# Patient Record
Sex: MALE | Race: WHITE
[De-identification: names, ages, dates, MRNs, and addresses within clinical notes are randomized per-mention and may not be internally consistent; named-entity substitution may affect disease eponyms.]

---

## 2019-01-06 ENCOUNTER — HOSPITAL ENCOUNTER (EMERGENCY)
Dept: HOSPITAL 84 - D.ER | Age: 29
Discharge: HOME | End: 2019-01-06
Payer: SELF-PAY

## 2019-01-06 VITALS — DIASTOLIC BLOOD PRESSURE: 79 MMHG | SYSTOLIC BLOOD PRESSURE: 132 MMHG

## 2019-01-06 VITALS — BODY MASS INDEX: 19.53 KG/M2 | HEIGHT: 63 IN | WEIGHT: 110.23 LBS

## 2019-01-06 DIAGNOSIS — K29.70: Primary | ICD-10-CM

## 2019-01-06 LAB
ALBUMIN SERPL-MCNC: 4.1 G/DL (ref 3.4–5)
ALP SERPL-CCNC: 125 U/L (ref 46–116)
ALT SERPL-CCNC: 22 U/L (ref 10–68)
AMYLASE SERPL-CCNC: 73 U/L (ref 25–115)
ANION GAP SERPL CALC-SCNC: 12.3 MMOL/L (ref 8–16)
APPEARANCE UR: CLEAR
BACTERIA #/AREA URNS HPF: (no result) /HPF
BASOPHILS NFR BLD AUTO: 0.6 % (ref 0–2)
BILIRUB SERPL-MCNC: 0.21 MG/DL (ref 0.2–1.3)
BILIRUB SERPL-MCNC: NEGATIVE MG/DL
BUN SERPL-MCNC: 24 MG/DL (ref 7–18)
CALCIUM SERPL-MCNC: 8.8 MG/DL (ref 8.5–10.1)
CHLORIDE SERPL-SCNC: 102 MMOL/L (ref 98–107)
CO2 SERPL-SCNC: 25.9 MMOL/L (ref 21–32)
COLOR UR: YELLOW
CREAT SERPL-MCNC: 1 MG/DL (ref 0.6–1.3)
EOSINOPHIL NFR BLD: 3.9 % (ref 0–7)
ERYTHROCYTE [DISTWIDTH] IN BLOOD BY AUTOMATED COUNT: 13.2 % (ref 11.5–14.5)
GLOBULIN SER-MCNC: 3.8 G/L
GLUCOSE SERPL-MCNC: 95 MG/DL (ref 74–106)
GLUCOSE SERPL-MCNC: NEGATIVE MG/DL
HCT VFR BLD CALC: 46.2 % (ref 42–54)
HGB BLD-MCNC: 15.4 G/DL (ref 13.5–17.5)
IMM GRANULOCYTES NFR BLD: 0.1 % (ref 0–5)
KETONES UR STRIP-MCNC: NEGATIVE MG/DL
LIPASE SERPL-CCNC: 68 U/L (ref 73–393)
LYMPHOCYTES NFR BLD AUTO: 42.4 % (ref 15–50)
MCH RBC QN AUTO: 32.4 PG (ref 26–34)
MCHC RBC AUTO-ENTMCNC: 33.3 G/DL (ref 31–37)
MCV RBC: 97.1 FL (ref 80–100)
MONOCYTES NFR BLD: 6.8 % (ref 2–11)
NEUTROPHILS NFR BLD AUTO: 46.2 % (ref 40–80)
NITRITE UR-MCNC: NEGATIVE MG/ML
OSMOLALITY SERPL CALC.SUM OF ELEC: 275 MOSM/KG (ref 275–300)
PH UR STRIP: 5 [PH] (ref 5–6)
PLATELET # BLD: 242 10X3/UL (ref 130–400)
PMV BLD AUTO: 10.7 FL (ref 7.4–10.4)
POTASSIUM SERPL-SCNC: 4.2 MMOL/L (ref 3.5–5.1)
PROT SERPL-MCNC: 7.9 G/DL (ref 6.4–8.2)
PROT UR-MCNC: NEGATIVE MG/DL
RBC # BLD AUTO: 4.76 10X6/UL (ref 4.2–6.1)
RBC #/AREA URNS HPF: (no result) /HPF (ref 0–5)
SODIUM SERPL-SCNC: 136 MMOL/L (ref 136–145)
SP GR UR STRIP: 1.01 (ref 1–1.02)
SQUAMOUS #/AREA URNS HPF: (no result) /HPF (ref 0–5)
UROBILINOGEN UR-MCNC: NORMAL MG/DL
WBC # BLD AUTO: 8.2 10X3/UL (ref 4.8–10.8)
WBC #/AREA URNS HPF: (no result) /HPF (ref 0–5)

## 2019-01-23 ENCOUNTER — HOSPITAL ENCOUNTER (INPATIENT)
Dept: HOSPITAL 84 - D.ER | Age: 29
LOS: 5 days | Discharge: HOME | DRG: 682 | End: 2019-01-28
Attending: FAMILY MEDICINE | Admitting: FAMILY MEDICINE
Payer: MEDICAID

## 2019-01-23 VITALS
WEIGHT: 99.21 LBS | HEIGHT: 63 IN | BODY MASS INDEX: 17.58 KG/M2 | WEIGHT: 99.21 LBS | BODY MASS INDEX: 17.58 KG/M2 | HEIGHT: 63 IN

## 2019-01-23 DIAGNOSIS — N17.9: Primary | ICD-10-CM

## 2019-01-23 DIAGNOSIS — M54.9: ICD-10-CM

## 2019-01-23 DIAGNOSIS — F41.9: ICD-10-CM

## 2019-01-23 DIAGNOSIS — Z59.0: ICD-10-CM

## 2019-01-23 DIAGNOSIS — F17.210: ICD-10-CM

## 2019-01-23 DIAGNOSIS — G89.29: ICD-10-CM

## 2019-01-23 DIAGNOSIS — I10: ICD-10-CM

## 2019-01-23 DIAGNOSIS — E43: ICD-10-CM

## 2019-01-23 DIAGNOSIS — M62.82: ICD-10-CM

## 2019-01-23 DIAGNOSIS — J44.9: ICD-10-CM

## 2019-01-23 DIAGNOSIS — G25.81: ICD-10-CM

## 2019-01-23 LAB
ALBUMIN SERPL-MCNC: 4.5 G/DL (ref 3.4–5)
ALP SERPL-CCNC: 117 U/L (ref 46–116)
ALT SERPL-CCNC: 35 U/L (ref 10–68)
ANION GAP SERPL CALC-SCNC: 29.4 MMOL/L (ref 8–16)
BASOPHILS NFR BLD AUTO: 0.1 % (ref 0–2)
BILIRUB SERPL-MCNC: 0.67 MG/DL (ref 0.2–1.3)
BUN SERPL-MCNC: 49 MG/DL (ref 7–18)
CALCIUM SERPL-MCNC: 8.4 MG/DL (ref 8.5–10.1)
CHLORIDE SERPL-SCNC: 100 MMOL/L (ref 98–107)
CO2 SERPL-SCNC: 16.3 MMOL/L (ref 21–32)
CREAT SERPL-MCNC: 1.7 MG/DL (ref 0.6–1.3)
EOSINOPHIL NFR BLD: 0 % (ref 0–7)
ERYTHROCYTE [DISTWIDTH] IN BLOOD BY AUTOMATED COUNT: 13.5 % (ref 11.5–14.5)
GLOBULIN SER-MCNC: 3.6 G/L
GLUCOSE SERPL-MCNC: 65 MG/DL (ref 74–106)
HCT VFR BLD CALC: 43.2 % (ref 42–54)
HGB BLD-MCNC: 14.7 G/DL (ref 13.5–17.5)
IMM GRANULOCYTES NFR BLD: 0.3 % (ref 0–5)
LYMPHOCYTES NFR BLD AUTO: 4.6 % (ref 15–50)
MCH RBC QN AUTO: 32.3 PG (ref 26–34)
MCHC RBC AUTO-ENTMCNC: 34 G/DL (ref 31–37)
MCV RBC: 94.9 FL (ref 80–100)
MONOCYTES NFR BLD: 4.1 % (ref 2–11)
NEUTROPHILS NFR BLD AUTO: 90.9 % (ref 40–80)
OSMOLALITY SERPL CALC.SUM OF ELEC: 291 MOSM/KG (ref 275–300)
PLATELET # BLD: 239 10X3/UL (ref 130–400)
PMV BLD AUTO: 11.1 FL (ref 7.4–10.4)
POTASSIUM SERPL-SCNC: 4.7 MMOL/L (ref 3.5–5.1)
PROT SERPL-MCNC: 8.1 G/DL (ref 6.4–8.2)
RBC # BLD AUTO: 4.55 10X6/UL (ref 4.2–6.1)
SODIUM SERPL-SCNC: 141 MMOL/L (ref 136–145)
WBC # BLD AUTO: 17.9 10X3/UL (ref 4.8–10.8)

## 2019-01-23 SDOH — ECONOMIC STABILITY - HOUSING INSECURITY: HOMELESSNESS: Z59.0

## 2019-01-24 VITALS — SYSTOLIC BLOOD PRESSURE: 110 MMHG | DIASTOLIC BLOOD PRESSURE: 68 MMHG

## 2019-01-24 VITALS — SYSTOLIC BLOOD PRESSURE: 108 MMHG | DIASTOLIC BLOOD PRESSURE: 72 MMHG

## 2019-01-24 VITALS — SYSTOLIC BLOOD PRESSURE: 101 MMHG | DIASTOLIC BLOOD PRESSURE: 62 MMHG

## 2019-01-24 VITALS — SYSTOLIC BLOOD PRESSURE: 110 MMHG | DIASTOLIC BLOOD PRESSURE: 75 MMHG

## 2019-01-24 VITALS — SYSTOLIC BLOOD PRESSURE: 111 MMHG | DIASTOLIC BLOOD PRESSURE: 55 MMHG

## 2019-01-24 VITALS — DIASTOLIC BLOOD PRESSURE: 63 MMHG | SYSTOLIC BLOOD PRESSURE: 109 MMHG

## 2019-01-24 LAB
BASOPHILS NFR BLD AUTO: 0.1 % (ref 0–2)
CK MB SERPL-MCNC: 20.2 U/L (ref 0–3.6)
CK MB SERPL-MCNC: 28.6 U/L (ref 0–3.6)
CK SERPL-CCNC: 2179 UL (ref 21–232)
CK SERPL-CCNC: 6113 UL (ref 21–232)
EOSINOPHIL NFR BLD: 0.7 % (ref 0–7)
ERYTHROCYTE [DISTWIDTH] IN BLOOD BY AUTOMATED COUNT: 13.8 % (ref 11.5–14.5)
HCT VFR BLD CALC: 39.3 % (ref 42–54)
HGB BLD-MCNC: 13.2 G/DL (ref 13.5–17.5)
IMM GRANULOCYTES NFR BLD: 0.2 % (ref 0–5)
LYMPHOCYTES NFR BLD AUTO: 23.7 % (ref 15–50)
MCH RBC QN AUTO: 32.2 PG (ref 26–34)
MCHC RBC AUTO-ENTMCNC: 33.6 G/DL (ref 31–37)
MCV RBC: 95.9 FL (ref 80–100)
MONOCYTES NFR BLD: 9.2 % (ref 2–11)
NEUTROPHILS NFR BLD AUTO: 66.1 % (ref 40–80)
PLATELET # BLD: 224 10X3/UL (ref 130–400)
PMV BLD AUTO: 11.3 FL (ref 7.4–10.4)
RBC # BLD AUTO: 4.1 10X6/UL (ref 4.2–6.1)
WBC # BLD AUTO: 12.1 10X3/UL (ref 4.8–10.8)

## 2019-01-24 NOTE — NUR
RESUMING CARE. PT LAYING IN BED A&O RR EVEN/UNLABORED, LFT AC WITH NS @125, ON
RA NO C/O PAIN OR DISTRESS NOTED CL IN REACH WILL CONT TO MONITOR

## 2019-01-24 NOTE — MORECARE
CASE MANAGEMENT DISCHARGE SUMMARY
 
 
PATIENT: ENOCH PEDERSON DON                UNIT: [id number]
ACCOUNT#: [id number]                       ADM DATE: 19
AGE: 28     : [birth year]  SEX: M            ROOM/BED: D.2131    
AUTHOR: DEXTER ASHLEY                             PHYSICIAN:                               
 
REFERRING PHYSICIAN: ATA CARVALHO DO            
DATE OF SERVICE: 19
Discharge Plan
 
 
Patient Name: ENOCH PEDERSON
[facility]
Encounter #: [id number]
Medical Record #: [id number]
: 1990
Planned Disposition: Home
Anticipated Discharge Date: 19
 
Discharge Date: 
Expected LOS: 1
Initial Reviewer: [id number]
Initial Review Date: 2019
Generated: 19   6:57 pm 
 DCPIA - Discharge Planning Initial Assessment
 
Updated by [id number]: Bennett Preciado on 19   5:57 pm
*  Is the patient Alert and Oriented?
Yes
*  How many steps to enter\exit or inside your home? NONE *  PCP NONE *  Pharmacy NONE * 
Preadmission Environment
Homeless
*  Other Environment
HOMELESS, REPORTS LIVING IN "[facility]"
*  Facility Name
NONE
*  ADLs
Independent
*  Equipment
None
*  Other Equipment
NO MEDICAL EQUIPMENT PROVIDER PREFERENCE
*  List name and contact numbers for known caregivers / representatives who 
currently or will assist patient after discharge:
NONE PER PATIENT
*  Verbal permission to speak to the caregivers and representatives has been 
obtained from the patient.
N/A
 
*  Community resources currently utilized
None
*  Please name any agencies selected above.
NONE
*  Additional services required to return to the preadmission environment?
No
*  Can the patient safely return to the preadmission environment?
Yes
*  Has this patient been hospitalized within the prior 30 days at any 
hospital?
No
 
 
 
 
 
 
Patient Name: ENOCH PEDERSON
Encounter #: [id number]
Page [id number]
 
 
 
 
 
Electronically Signed by DEXTER ASHLEY on 19 at 1757
 
 
 
 
 
 
**All edits/amendments must be made on the electronic document**
 
DICTATION DATE: 19     : FRANTZ  19     
RPT#: [id number]                                DC DATE:        
                                               STATUS: ADM IN  
[facility]
 [place] [address]
***END OF REPORT***

## 2019-01-24 NOTE — MORECARE
CASE MANAGEMENT DISCHARGE SUMMARY
 
 
PATIENT: ENOCH PEDERSON DON                UNIT: [id number]
ACCOUNT#: [id number]                       ADM DATE: 19
AGE: 28     : [birth year]  SEX: M            ROOM/BED: D.2135    
AUTHOR: GABI,DOC                             PHYSICIAN:                               
 
REFERRING PHYSICIAN: ATA CARVALHO DO            
DATE OF SERVICE: 19
Discharge Plan
 
 
Patient Name: ENOCH PEDERSON
Facility: [facility]:[place]
Encounter #: [id number]
Medical Record #: [id number]
: 1990
Planned Disposition: Home
Anticipated Discharge Date: 19
 
Discharge Date: 
Expected LOS: 1
Initial Reviewer: [email or web address]
Initial Review Date: 2019
Generated: 19   7:10 pm 
Comments
 
DCP- Discharge Planning
 
Updated by [id number]: Bennett Preciado on 19   5:04 pm CT
Patient Name: ENOCH PEDERSON                                     
Admission Status: ER   
Accout number: [id number]                              
Admission Date: 2019   
: 1990                                                        
Admission Diagnosis:   
Attending: ATA CARVALHO                                                
Current LOS:  1   
  
Anticipated DC Date: 2019   
Planned Disposition: Home   
Primary Insurance: MEDICAID [place]   
  
  
Discharge Planning Comments:   
CM RECEIVED CONSULT FOR HOMLESSNESS.  CM MET WITH PT IN ROOM TO DISCUSS 
DISCHARGE PLANNING AND NEEDS.  PT RE[place], HAS 
BEEN HOMELESS FOR " A WHILE."  PT USES NO MEDICAL EQUIPMENT.  PT REPORTS 
HAVING NO [place] ID AND HE NEEDS TO GET ONE AS HE HAS A [place] ID NOW.  
 
PT DENIES HAVING FUNDS TO ASSIST HIMSELF, PT REPORTS HE HAS NO FAMILY OR 
FRIENDS TO ASSIST AT DISCHARGE.  CM DISCUSSED AVAILABILITY OF SHELTER AT 
[facility] IN [place].  PT STATES AWARENESS OF THE SHELTER, 
REPORTS "THAT'S LIKE A halfway".  PT WILL NOT RETURN TO THE SHELTER.  CM OFFERED 
TO LOOK FOR SHELTER OUTSIDE OF [place], PT REFUSED.  PT IS NOT SURE HOW 
LONG HE WILL BE STAYING IN [place].  CM DISCUSSED EMERGENCY ASSISTANCE, 
FOOD PANTRY AND HOT MEAL DAILY AT [facility], PT REPORTS HE IS AWARE OF 
RESOURCE.  CM PROVIDED PT WITH [facility] ADDRESS AS WELL AS CONTACT FOR 
[facility] OUT OF POVERTY PROGRAM.  PT DID 
NOT VERBALIZE INTEREST, CM LET INFORMATION ANYWAY.  PT HAS NO PRIMARY DOCTOR, 
BUT DOES HAVE MEDICAID IN [place].  CM PROVIDED [facility] 
INFORMATION.  CM ASKED PT HOW HE IS TRANSPORTING WHEN HE LEAVES, PT REPORTS 
WALKING BUT HE HAS NO SHOES AND WEARS SIZE 9.    
  
CM WILL PROVIDE PT WITH A BUS PASS AT DISCHARGE.  PT HAS DECLINED SHELTER 
PLACEMENT ASSISTANCE, REFUSES TO STAY AT THE [facility] SAYING IT 
IS LIKE A halfway.  CM PROVIDED PT WITH LOCAL RESOURCE INFORMATION.  CM TO 
FOLLOW AND ASSIST AS NEEDED.  
  
: Bennett Preciado
 DCPIA - Discharge Planning Initial Assessment
 
Updated by [id number]: Bennett Preciado on 19   5:57 pm
*  Is the patient Alert and Oriented?
Yes
*  How many steps to enter\exit or inside your home? NONE *  PCP NONE *  Pharmacy NONE * 
Preadmission Environment
Homeless
*  Other Environment
HOMELESS, REPORTS LIVING IN "[facility]"
*  Facility Name
NONE
*  ADLs
Independent
*  Equipment
None
*  Other Equipment
NO MEDICAL EQUIPMENT PROVIDER PREFERENCE
*  List name and contact numbers for known caregivers / representatives who 
currently or will assist patient after discharge:
NONE PER PATIENT
*  Verbal permission to speak to the caregivers and representatives has been 
obtained from the patient.
N/A
*  Community resources currently utilized
None
*  Please name any agencies selected above.
NONE
*  Additional services required to return to the preadmission environment?
No
*  Can the patient safely return to the preadmission environment?
Yes
 
*  Has this patient been hospitalized within the prior 30 days at any 
hospital?
No
 
 
 
 
 
 
 
Last DP export: 19   4:57 p
Patient Name: ENOCH PEDERSON
Encounter #: [id number]
Page [phone]
 
 
 
 
 
Electronically Signed by DEXTER ASHLEY on 19 at 1810
 
 
 
 
 
 
**All edits/amendments must be made on the electronic document**
 
DICTATION DATE: 19     : FRANTZ  19     
RPT#: [id number]                                CT DATE:        
                                               STATUS: ADM IN  
[facility]
 [place] [address]
***END OF REPORT***

## 2019-01-25 VITALS — DIASTOLIC BLOOD PRESSURE: 66 MMHG | SYSTOLIC BLOOD PRESSURE: 109 MMHG

## 2019-01-25 VITALS — SYSTOLIC BLOOD PRESSURE: 101 MMHG | DIASTOLIC BLOOD PRESSURE: 53 MMHG

## 2019-01-25 VITALS — DIASTOLIC BLOOD PRESSURE: 69 MMHG | SYSTOLIC BLOOD PRESSURE: 110 MMHG

## 2019-01-25 VITALS — SYSTOLIC BLOOD PRESSURE: 107 MMHG | DIASTOLIC BLOOD PRESSURE: 68 MMHG

## 2019-01-25 VITALS — SYSTOLIC BLOOD PRESSURE: 117 MMHG | DIASTOLIC BLOOD PRESSURE: 63 MMHG

## 2019-01-25 LAB
ALBUMIN SERPL-MCNC: 2.9 G/DL (ref 3.4–5)
ALP SERPL-CCNC: 78 U/L (ref 46–116)
ALT SERPL-CCNC: 59 U/L (ref 10–68)
ANION GAP SERPL CALC-SCNC: 13.5 MMOL/L (ref 8–16)
BASOPHILS NFR BLD AUTO: 0.4 % (ref 0–2)
BILIRUB SERPL-MCNC: 0.51 MG/DL (ref 0.2–1.3)
BUN SERPL-MCNC: 16 MG/DL (ref 7–18)
CALCIUM SERPL-MCNC: 7.3 MG/DL (ref 8.5–10.1)
CHLORIDE SERPL-SCNC: 110 MMOL/L (ref 98–107)
CK MB SERPL-MCNC: 10.4 U/L (ref 0–3.6)
CK SERPL-CCNC: 3678 UL (ref 21–232)
CO2 SERPL-SCNC: 23.9 MMOL/L (ref 21–32)
CREAT SERPL-MCNC: 1.1 MG/DL (ref 0.6–1.3)
EOSINOPHIL NFR BLD: 2.3 % (ref 0–7)
ERYTHROCYTE [DISTWIDTH] IN BLOOD BY AUTOMATED COUNT: 13.8 % (ref 11.5–14.5)
GLOBULIN SER-MCNC: 2.5 G/L
GLUCOSE SERPL-MCNC: 78 MG/DL (ref 74–106)
HCT VFR BLD CALC: 34.3 % (ref 42–54)
HGB BLD-MCNC: 11.3 G/DL (ref 13.5–17.5)
IMM GRANULOCYTES NFR BLD: 0.1 % (ref 0–5)
LYMPHOCYTES NFR BLD AUTO: 47.5 % (ref 15–50)
MAGNESIUM SERPL-MCNC: 1.7 MG/DL (ref 1.8–2.4)
MCH RBC QN AUTO: 31.5 PG (ref 26–34)
MCHC RBC AUTO-ENTMCNC: 32.9 G/DL (ref 31–37)
MCV RBC: 95.5 FL (ref 80–100)
MONOCYTES NFR BLD: 12 % (ref 2–11)
NEUTROPHILS NFR BLD AUTO: 37.7 % (ref 40–80)
OSMOLALITY SERPL CALC.SUM OF ELEC: 284 MOSM/KG (ref 275–300)
PLATELET # BLD: 196 10X3/UL (ref 130–400)
PMV BLD AUTO: 11 FL (ref 7.4–10.4)
POTASSIUM SERPL-SCNC: 4.4 MMOL/L (ref 3.5–5.1)
PROT SERPL-MCNC: 5.4 G/DL (ref 6.4–8.2)
RBC # BLD AUTO: 3.59 10X6/UL (ref 4.2–6.1)
SODIUM SERPL-SCNC: 143 MMOL/L (ref 136–145)
WBC # BLD AUTO: 7.4 10X3/UL (ref 4.8–10.8)

## 2019-01-25 NOTE — MORECARE
CASE MANAGEMENT DISCHARGE SUMMARY
 
 
PATIENT: ENOCH PEDERSON DON                UNIT: [id number]
ACCOUNT#: [id number]                       ADM DATE: 19
AGE: 28     : [birth year]  SEX: M            ROOM/BED: D.2135    
AUTHOR: DEXTER ASHLEY                             PHYSICIAN:                               
 
REFERRING PHYSICIAN: ATA CARVALHO DO            
DATE OF SERVICE: 19
Discharge Plan
 
 
Patient Name: ENOCH PEDERSON
Facility: [facility]:[place]
Encounter #: [id number]
Medical Record #: [id number]
: 1990
Planned Disposition: Home
Anticipated Discharge Date: 19
 
Discharge Date: 
Expected LOS: 1
Initial Reviewer: [id number]
Initial Review Date: 2019
Generated: 19   9:41 am 
Comments
 
DCP- Discharge Planning
 
Updated by [id number]: Bennett Preciado on 19   7:36 am CT
Patient Name:  ENOCH PEDERSON   
Encounter No:  [id number]   
:  1990   
Primary Insurance:  MEDICAID [place]  
Anticipated DC Date: 2019   
Planned Disposition:  Home  
  
  
DCP follow-up note: CM SPOKE TO PT IN ROOM REGARDING DISCHARGE PLANNING.  PT 
IS STILL REFUSING SHELTER PLACEMENT OUT OF [place] AREA AND WILL NOT GO 
TO [facility].  PT IS AWARE OF LOCAL SHELTER AND 
RESOURCES AVAILABLE TO HIM.  PT REPORTS PLAN OF CONTINUED HOMELESSNESS AT 
DISCHARGE.  PT DOES NOT HAVE AN INCOME, DOES NOT RECEIVE FOOD STAMPS, 
DISABILITY OR UNEMPLOYMENT. PT IS NOT EMPLOYED.   PT REPORTS HE LIVES WITH 
WHAT HE CAN GET FROM LOCAL ORGANIZATIONS.  PT HAS BEEN PROVIDED WITH LOCAL 
NEWSPAPER FOR TODAY.  CM PROVIDED PT WITH A PAIR OF SHOES AS PT REPORTED HE 
HAS NONE.  CM PROVIDED PT WITH AN OLD BUT SERVICEABLE UMBRELLA AND THREE 
LARGE TRASH BAGS.  CM EXPLAINED HOW TO USE PLASTIC BAGS FOR A RAIN PONCHO, 
SHELTER ADRYAN AND SHELTER FABRICIO IF NEEDED.  CM PROVIDED ONE BUS PASS SO 
 
PT CAN GET BACK TO [facility] [place] AND COMMUNITY RESOURCE 
ORGANIZATIONS THAT PT IS UTILIZING AT DISCHARGE.  PT DOES HAVE [place] 
MEDICAID.  
  
PT REFUSES SHELTER PLACEMENT.  PT IS AWARE OF COMMUNITY RESOURCES.  CM HAS 
PROVIDED PT WITH INFORMATION AND MATERIALS TO ASSIST HIMSELF.  NO FURHTER 
DISCHARGE NEEDS IDENTIFIED AT THIS TIME.    
  
Bennett Preciado, CASE MANAGEMENT
DCP- Discharge Planning
 
Updated by [id number]: Bennett Preciado on 19   5:04 pm CT
Patient Name: ENOCH PEDERSON                                     
Admission Status: ER   
Accout number: [id number]                              
Admission Date: 2019   
: 1990                                                        
Admission Diagnosis:   
Attending: ATA CARVALHO                                                
Current LOS:  1   
  
Anticipated DC Date: 2019   
Planned Disposition: Home   
Primary Insurance: MEDICAID [place]   
  
  
Discharge Planning Comments:   
CM RECEIVED CONSULT FOR HOMLESSNESS.  CM MET WITH PT IN ROOM TO DISCUSS 
DISCHARGE PLANNING AND NEEDS.  PT REPORTS LIVING IN THE [place] FOR MONTHS, HAS 
BEEN HOMELESS FOR " A WHILE."  PT USES NO MEDICAL EQUIPMENT.  PT REPORTS 
HAVING NO [place] ID AND HE NEEDS TO GET ONE AS HE HAS A [place] ID NOW.  
PT DENIES HAVING FUNDS TO ASSIST HIMSELF, PT REPORTS HE HAS NO FAMILY OR 
FRIENDS TO ASSIST AT DISCHARGE.  CM DISCUSSED AVAILABILITY OF SHELTER AT 
[facility] IN [place].  PT STATES AWARENESS OF THE SHELTER, 
REPORTS "THAT'S LIKE A alf".  PT WILL NOT RETURN TO THE SHELTER.  CM OFFERED 
TO LOOK FOR SHELTER OUTSIDE OF [place], PT REFUSED.  PT IS NOT SURE HOW 
LONG HE WILL BE STAYING IN [place].  CM DISCUSSED EMERGENCY ASSISTANCE, 
FOOD PANTRY AND HOT MEAL DAILY AT [facility], PT REPORTS HE IS AWARE OF 
RESOURCE.  CM PROVIDED PT WITH [facility] ADDRESS AS WELL AS CONTACT FOR 
[facility] [place] OUT OF POVERTY PROGRAM.  PT DID 
NOT VERBALIZE INTEREST, CM LET INFORMATION ANYWAY.  PT HAS NO PRIMARY DOCTOR, 
BUT DOES HAVE MEDICAID IN [place].  CM PROVIDED [facility] 
INFORMATION.  CM ASKED PT HOW HE IS TRANSPORTING WHEN HE LEAVES, PT REPORTS 
WALKING BUT HE HAS NO SHOES AND WEARS SIZE 9.    
  
CM WILL PROVIDE PT WITH A BUS PASS AT DISCHARGE.  PT HAS DECLINED SHELTER 
PLACEMENT ASSISTANCE, REFUSES TO STAY AT THE [facility] SAYING IT 
IS LIKE A alf.  CM PROVIDED PT WITH LOCAL RESOURCE INFORMATION.  CM TO 
FOLLOW AND ASSIST AS NEEDED.  
  
: Bennett Preciado
 DCPIA - Discharge Planning Initial Assessment
 
 
Updated by [id number]: Bennett Preciado on 19   5:57 pm
*  Is the patient Alert and Oriented?
Yes
*  How many steps to enter\exit or inside your home? NONE *  PCP NONE *  Pharmacy NONE * 
Preadmission Environment
Homeless
*  Other Environment
HOMELESS, REPORTS [place]IVING [place]"
*  Facility Name
NONE
*  ADLs
Independent
*  Equipment
None
*  Other Equipment
NO MEDICAL EQUIPMENT PROVIDER PREFERENCE
*  List name and contact numbers for known caregivers / representatives who 
currently or will assist patient after discharge:
NONE PER PATIENT
*  Verbal permission to speak to the caregivers and representatives has been 
obtained from the patient.
N/A
*  Community resources currently utilized
None
*  Please name any agencies selected above.
NONE
*  Additional services required to return to the preadmission environment?
No
*  Can the patient safely return to the preadmission environment?
Yes
*  Has this patient been hospitalized within the prior 30 days at any 
hospital?
No
 
 
 
 
 
 
 
Last DP export: 19   5:10 p
Patient Name: ENOCH PEDERSON
 
Encounter #: [id number]
Page [phone]
 
 
 
 
 
Electronically Signed by DEXTER ASHLEY on 19 at 0841
 
 
 
 
 
 
**All edits/amendments must be made on the electronic document**
 
DICTATION DATE: 19     : FRANTZ  19     
RPT#: [id number]                                DC DATE:        
                                               STATUS: ADM IN  
[facility]
[address] [place] [address]
***END OF REPORT***

## 2019-01-25 NOTE — NUR
REPORT RECEIVED FROM NIGHT SHIFT. PATIENT LAYING IN BED WITH EYES CLOSED AND
BREATHING EVENLY. SR UP X 2 BED IN LOW POSITON AND CALL LIGHT IN REACH. WILL
CONTINUE WITH PLAN OF CARE.

## 2019-01-25 NOTE — NUR
INTRODUCED SELF TO PATIENT, PATIENT RESTING ON RIGHT SIDE STATES THAT HE'S IN
PAIN AND HIS HAND KEEPS SHAKING. ADVISED PATIENT WILL CHECK FOR LAST PAIN MED.
RESP EVEN AND UNLABORED. BED IN LOWEST POSITION, CALL LIGHT WITHIN REACH.

## 2019-01-25 NOTE — NUR
REPORT RECIEVED FROM NIGHT SHIFT. PATIENT LAYING IN BED WITH EYES CLOSED AND
BREATHING EVENLY. SR UP X 2 . BED IN LOW PSOTION AND CALL LIGHT IN REACH.

## 2019-01-26 VITALS — SYSTOLIC BLOOD PRESSURE: 118 MMHG | DIASTOLIC BLOOD PRESSURE: 64 MMHG

## 2019-01-26 VITALS — DIASTOLIC BLOOD PRESSURE: 57 MMHG | SYSTOLIC BLOOD PRESSURE: 104 MMHG

## 2019-01-26 VITALS — DIASTOLIC BLOOD PRESSURE: 62 MMHG | SYSTOLIC BLOOD PRESSURE: 106 MMHG

## 2019-01-26 VITALS — SYSTOLIC BLOOD PRESSURE: 119 MMHG | DIASTOLIC BLOOD PRESSURE: 67 MMHG

## 2019-01-26 VITALS — DIASTOLIC BLOOD PRESSURE: 69 MMHG | SYSTOLIC BLOOD PRESSURE: 120 MMHG

## 2019-01-26 LAB
ALBUMIN SERPL-MCNC: 2.8 G/DL (ref 3.4–5)
ALP SERPL-CCNC: 80 U/L (ref 46–116)
ALT SERPL-CCNC: 58 U/L (ref 10–68)
ANION GAP SERPL CALC-SCNC: 14.5 MMOL/L (ref 8–16)
BASOPHILS NFR BLD AUTO: 0.4 % (ref 0–2)
BILIRUB SERPL-MCNC: 0.39 MG/DL (ref 0.2–1.3)
BUN SERPL-MCNC: 16 MG/DL (ref 7–18)
CALCIUM SERPL-MCNC: 7.7 MG/DL (ref 8.5–10.1)
CHLORIDE SERPL-SCNC: 107 MMOL/L (ref 98–107)
CK MB SERPL-MCNC: 3.3 U/L (ref 0–3.6)
CK MB SERPL-MCNC: 4 U/L (ref 0–3.6)
CK SERPL-CCNC: 1798 UL (ref 21–232)
CK SERPL-CCNC: 1861 UL (ref 21–232)
CO2 SERPL-SCNC: 25.8 MMOL/L (ref 21–32)
CREAT SERPL-MCNC: 0.8 MG/DL (ref 0.6–1.3)
EOSINOPHIL NFR BLD: 3.3 % (ref 0–7)
ERYTHROCYTE [DISTWIDTH] IN BLOOD BY AUTOMATED COUNT: 13.9 % (ref 11.5–14.5)
GLOBULIN SER-MCNC: 2.9 G/L
GLUCOSE SERPL-MCNC: 91 MG/DL (ref 74–106)
HCT VFR BLD CALC: 38.7 % (ref 42–54)
HGB BLD-MCNC: 12.7 G/DL (ref 13.5–17.5)
IMM GRANULOCYTES NFR BLD: 0.1 % (ref 0–5)
LYMPHOCYTES NFR BLD AUTO: 51.4 % (ref 15–50)
MAGNESIUM SERPL-MCNC: 1.8 MG/DL (ref 1.8–2.4)
MCH RBC QN AUTO: 31.8 PG (ref 26–34)
MCHC RBC AUTO-ENTMCNC: 32.8 G/DL (ref 31–37)
MCV RBC: 96.8 FL (ref 80–100)
MONOCYTES NFR BLD: 9.5 % (ref 2–11)
NEUTROPHILS NFR BLD AUTO: 35.3 % (ref 40–80)
OSMOLALITY SERPL CALC.SUM OF ELEC: 285 MOSM/KG (ref 275–300)
PLATELET # BLD: 193 10X3/UL (ref 130–400)
PMV BLD AUTO: 11.3 FL (ref 7.4–10.4)
POTASSIUM SERPL-SCNC: 4.3 MMOL/L (ref 3.5–5.1)
PROT SERPL-MCNC: 5.7 G/DL (ref 6.4–8.2)
RBC # BLD AUTO: 4 10X6/UL (ref 4.2–6.1)
SODIUM SERPL-SCNC: 143 MMOL/L (ref 136–145)
TROPONIN I SERPL-MCNC: < 0.017 NG/ML (ref 0–0.06)
WBC # BLD AUTO: 6.8 10X3/UL (ref 4.8–10.8)

## 2019-01-26 NOTE — NUR
INITIAL ROUNDS AND ASSESSMENT COMPLETED. SR PER TELEMETRY. NONLABORED
RESPIRATIONS ON ROOM AIR. IV TO RFA SALINE LOCKED. PT WANTING TO TAKE A
SHOWER. PROVIDED BATHING SUPPLIES/GOWN AND PT NOW SHOWERING. MONITOR AND CPOC.

## 2019-01-26 NOTE — NUR
PATIENT RESTING QUIETLY, RESP EVEN AND UNLABORED. BED IN LOWEST POSITION, CALL
LIGHT WITHIN REACH. NO NEEDS AT THIS TIME.

## 2019-01-26 NOTE — NUR
CALLED INTO PTS ROOM. STATES HE IS HAVING CHEST PAIN AT HEART LEVEL AND BELOW.
PT HAS AT LEAST 6 SODAS (REGULAR COKES) AT BEDSIDE THAT HE HAS DRANK. MORE IN
TRASH. PT HAS BEEN EATING MASS AMOUNTS OF FOOD FREQUENTLY DURING HIS STAY AS
WELL. HE IS 70S SR ON THE MONITOR. LYING IWTH EYES CLOSED WHILE HE TALKS TO
BE, DOES NOT EXHIBIT S/S OF ACUTE DISTRESS. CALLED SUZIE.

## 2019-01-27 VITALS — DIASTOLIC BLOOD PRESSURE: 67 MMHG | SYSTOLIC BLOOD PRESSURE: 113 MMHG

## 2019-01-27 VITALS — SYSTOLIC BLOOD PRESSURE: 127 MMHG | DIASTOLIC BLOOD PRESSURE: 70 MMHG

## 2019-01-27 VITALS — SYSTOLIC BLOOD PRESSURE: 122 MMHG | DIASTOLIC BLOOD PRESSURE: 63 MMHG

## 2019-01-27 VITALS — DIASTOLIC BLOOD PRESSURE: 74 MMHG | SYSTOLIC BLOOD PRESSURE: 109 MMHG

## 2019-01-27 LAB
ALBUMIN SERPL-MCNC: 3.4 G/DL (ref 3.4–5)
ALP SERPL-CCNC: 98 U/L (ref 46–116)
ALT SERPL-CCNC: 63 U/L (ref 10–68)
ANION GAP SERPL CALC-SCNC: 15.4 MMOL/L (ref 8–16)
BASOPHILS NFR BLD AUTO: 0.7 % (ref 0–2)
BILIRUB SERPL-MCNC: 0.26 MG/DL (ref 0.2–1.3)
BUN SERPL-MCNC: 20 MG/DL (ref 7–18)
CALCIUM SERPL-MCNC: 8.4 MG/DL (ref 8.5–10.1)
CHLORIDE SERPL-SCNC: 102 MMOL/L (ref 98–107)
CK MB SERPL-MCNC: 1.5 U/L (ref 0–3.6)
CK SERPL-CCNC: 760 UL (ref 21–232)
CO2 SERPL-SCNC: 27.1 MMOL/L (ref 21–32)
CREAT SERPL-MCNC: 1 MG/DL (ref 0.6–1.3)
EOSINOPHIL NFR BLD: 4.4 % (ref 0–7)
ERYTHROCYTE [DISTWIDTH] IN BLOOD BY AUTOMATED COUNT: 13.6 % (ref 11.5–14.5)
GLOBULIN SER-MCNC: 3.6 G/L
GLUCOSE SERPL-MCNC: 103 MG/DL (ref 74–106)
HCT VFR BLD CALC: 43.9 % (ref 42–54)
HGB BLD-MCNC: 14.6 G/DL (ref 13.5–17.5)
IMM GRANULOCYTES NFR BLD: 0.1 % (ref 0–5)
LYMPHOCYTES NFR BLD AUTO: 43 % (ref 15–50)
MAGNESIUM SERPL-MCNC: 2.1 MG/DL (ref 1.8–2.4)
MCH RBC QN AUTO: 32.2 PG (ref 26–34)
MCHC RBC AUTO-ENTMCNC: 33.3 G/DL (ref 31–37)
MCV RBC: 96.7 FL (ref 80–100)
MONOCYTES NFR BLD: 9.3 % (ref 2–11)
NEUTROPHILS NFR BLD AUTO: 42.5 % (ref 40–80)
OSMOLALITY SERPL CALC.SUM OF ELEC: 281 MOSM/KG (ref 275–300)
PLATELET # BLD: 250 10X3/UL (ref 130–400)
PMV BLD AUTO: 11.5 FL (ref 7.4–10.4)
POTASSIUM SERPL-SCNC: 4.5 MMOL/L (ref 3.5–5.1)
PROT SERPL-MCNC: 7 G/DL (ref 6.4–8.2)
RBC # BLD AUTO: 4.54 10X6/UL (ref 4.2–6.1)
SODIUM SERPL-SCNC: 140 MMOL/L (ref 136–145)
WBC # BLD AUTO: 6.9 10X3/UL (ref 4.8–10.8)

## 2019-01-27 NOTE — NUR
RECIEVED LAYING IN BED WITH EYES CLOSED. EASILY AROUSES WITH VERBAL STIMULI.
ORIENTED X4. IV TO RIGHT FA WITH NS AT KVO. UP AD ALETHEA. TELEMETRY IN PLACE.
WILL CONT POC.

## 2019-01-28 VITALS — DIASTOLIC BLOOD PRESSURE: 61 MMHG | SYSTOLIC BLOOD PRESSURE: 116 MMHG

## 2019-01-28 VITALS — DIASTOLIC BLOOD PRESSURE: 67 MMHG | SYSTOLIC BLOOD PRESSURE: 116 MMHG

## 2019-01-28 VITALS — DIASTOLIC BLOOD PRESSURE: 63 MMHG | SYSTOLIC BLOOD PRESSURE: 113 MMHG

## 2019-01-28 VITALS — SYSTOLIC BLOOD PRESSURE: 118 MMHG | DIASTOLIC BLOOD PRESSURE: 67 MMHG

## 2019-01-28 LAB
ALBUMIN SERPL-MCNC: 3.1 G/DL (ref 3.4–5)
ALP SERPL-CCNC: 93 U/L (ref 46–116)
ALT SERPL-CCNC: 70 U/L (ref 10–68)
ANION GAP SERPL CALC-SCNC: 14 MMOL/L (ref 8–16)
BASOPHILS NFR BLD AUTO: 0.7 % (ref 0–2)
BILIRUB SERPL-MCNC: 0.19 MG/DL (ref 0.2–1.3)
BUN SERPL-MCNC: 25 MG/DL (ref 7–18)
CALCIUM SERPL-MCNC: 8.1 MG/DL (ref 8.5–10.1)
CHLORIDE SERPL-SCNC: 101 MMOL/L (ref 98–107)
CK MB SERPL-MCNC: 0.4 U/L (ref 0–3.6)
CK SERPL-CCNC: 256 UL (ref 21–232)
CO2 SERPL-SCNC: 26.4 MMOL/L (ref 21–32)
CREAT SERPL-MCNC: 1 MG/DL (ref 0.6–1.3)
EOSINOPHIL NFR BLD: 5.4 % (ref 0–7)
ERYTHROCYTE [DISTWIDTH] IN BLOOD BY AUTOMATED COUNT: 13.9 % (ref 11.5–14.5)
GLOBULIN SER-MCNC: 3.5 G/L
GLUCOSE SERPL-MCNC: 100 MG/DL (ref 74–106)
HCT VFR BLD CALC: 42.2 % (ref 42–54)
HGB BLD-MCNC: 13.8 G/DL (ref 13.5–17.5)
IMM GRANULOCYTES NFR BLD: 0.1 % (ref 0–5)
LYMPHOCYTES NFR BLD AUTO: 36.9 % (ref 15–50)
MAGNESIUM SERPL-MCNC: 1.8 MG/DL (ref 1.8–2.4)
MCH RBC QN AUTO: 32.1 PG (ref 26–34)
MCHC RBC AUTO-ENTMCNC: 32.7 G/DL (ref 31–37)
MCV RBC: 98.1 FL (ref 80–100)
MONOCYTES NFR BLD: 8.8 % (ref 2–11)
NEUTROPHILS NFR BLD AUTO: 48.1 % (ref 40–80)
OSMOLALITY SERPL CALC.SUM OF ELEC: 277 MOSM/KG (ref 275–300)
PLATELET # BLD: 249 10X3/UL (ref 130–400)
PMV BLD AUTO: 11.3 FL (ref 7.4–10.4)
POTASSIUM SERPL-SCNC: 4.4 MMOL/L (ref 3.5–5.1)
PROT SERPL-MCNC: 6.6 G/DL (ref 6.4–8.2)
RBC # BLD AUTO: 4.3 10X6/UL (ref 4.2–6.1)
SODIUM SERPL-SCNC: 137 MMOL/L (ref 136–145)
WBC # BLD AUTO: 6.8 10X3/UL (ref 4.8–10.8)

## 2019-01-28 NOTE — MORECARE
CASE MANAGEMENT DISCHARGE SUMMARY
 
 
PATIENT: ENOCH PEDERSON DON                UNIT: [id number]
ACCOUNT#: [id number]                       ADM DATE: 19
AGE: 28     : [birth year]  SEX: M            ROOM/BED: D.2131    
AUTHOR: GABIDOC                             PHYSICIAN:                               
 
REFERRING PHYSICIAN: ATA CARVALHO DO            
DATE OF SERVICE: 19
Discharge Plan
 
 
Patient Name: ENOCH PEDERSON
Facility: [facility]:[place]
Encounter #: [id number]
Medical Record #: [id number]
: 1990
Planned Disposition: Home
Anticipated Discharge Date: 19
 
Discharge Date: 
Expected LOS: 1
Initial Reviewer: [id number]
Initial Review Date: 2019
Generated: 19   5:39 pm 
Comments
 
DCP- Discharge Planning
 
Updated by [id number]: Payton Villalba on 19   3:30 pm CT
PATIENTS BEDSIDE NURSE CAME TO ME ASKING FOR A LETTER FOR THE PATIENT STATING 
THAT WE PUT HIM ON KLONOPIN AND ULTRAM HERE AT THE HOSPITAL BECAUSE HE WILL 
NEED THAT FOR HIS . I PROVIDED A NOTICE LETTER AND HAND 
WROTE THAT THE PATIENT WAS ON THE FOLLOWING MEDS, THE LAST TIME THEY WERE 
TAKEN, WHAT THEY WERE ORDERED FOR, AND THAT THEY WERE NOT PRESCRIBED FOR THE 
PATIENT ON DISCHARGE. I SIGNED MY NAME, CREDENTIALS, AND CONTACT NUMBER FOR 
QUESTIONS. I ALSO NOTED FOR THE PATIENT THAT A COPY OF THE LETTER WAS PLACED 
IN HIS CHART.   
THIS PATIENT IS NOT WANTING TO LEAVE THE HOSPITAL DUE TO BEING HOMELESS, BUT 
HIS  COULD ASSIST IN CHANGING THAT IF HE ASKED.
DCP- Discharge Planning
 
Updated by [email or web address]: Bennett Preciado on 19   7:36 am CT
Patient Name:  ENOCH PEDERSON   
Encounter No:  [id number]   
:  1990   
Primary Insurance:  [place]  
Anticipated DC Date: 2019   
Planned Disposition:  Home  
 
  
  
DCP follow-up note: CM SPOKE TO PT IN ROOM REGARDING DISCHARGE PLANNING.  PT 
IS STILL REFUSING SHELTER PLACEMENT OUT OF [facility] AND WILL NOT GO 
TO [facility].  PT IS AWARE OF LOCAL SHELTER AND 
RESOURCES AVAILABLE TO HIM.  PT REPORTS PLAN OF CONTINUED HOMELESSNESS AT 
DISCHARGE.  PT DOES NOT HAVE AN INCOME, DOES NOT RECEIVE FOOD STAMPS, 
DISABILITY OR UNEMPLOYMENT. PT IS NOT EMPLOYED.   PT REPORTS HE LIVES WITH 
WHAT HE CAN GET FROM LOCAL ORGANIZATIONS.  PT HAS BEEN PROVIDED WITH LOCAL 
NEWSPAPER FOR TODAY.  CM PROVIDED PT WITH A PAIR OF SHOES AS PT REPORTED HE 
HAS NONE.  CM PROVIDED PT WITH AN OLD BUT SERVICEABLE UMBRELLA AND THREE 
LARGE TRASH BAGS.  CM EXPLAINED HOW TO USE PLASTIC BAGS FOR A RAIN PONCHO, 
SHELTER ADRYAN AND SHELTER FABRICIO IF NEEDED.  CM PROVIDED ONE BUS PASS SO 
PT CAN GET BACK TO [facility] OF [facility] AND COMMUNITY RESOURCE 
ORGANIZATIONS THAT PT IS UTILIZING AT DISCHARGE.  PT DOES HAVE [place] 
MEDICAID.  
  
PT REFUSES SHELTER PLACEMENT.  PT IS AWARE OF COMMUNITY RESOURCES.  CM HAS 
PROVIDED PT WITH INFORMATION AND MATERIALS TO ASSIST HIMSELF.  NO FURHTER 
DISCHARGE NEEDS IDENTIFIED AT THIS TIME.    
  
Bennett Preciado, CASE MANAGEMENT
DCP- Discharge Planning
 
Updated by [id number]: Bennett Preciado on 19   5:04 pm CT
Patient Name: ENOCH PEDERSON                                     
Admission Status: ER   
Accout number: [id number]                              
Admission Date: 2019   
: 1990                                                        
Admission Diagnosis:   
Attending: ATA CARVALHO                                                
Current LOS:  1   
  
Anticipated DC Date: 2019   
Planned Disposition: Home   
Primary Insurance: [place]   
  
  
Discharge Planning Comments:   
CM RECEIVED CONSULT FOR HOMLESSNESS.  CM MET WITH PT IN ROOM TO DISCUSS 
DISCHARGE PLANNING AND NEEDS.  PT REPORTS LIVING [place] FOR [place]S, HAS 
BEEN HOMELESS FOR " A WHILE."  PT USES NO MEDICAL EQUIPMENT.  PT REPORTS 
HAVING NO [place] ID AND HE NEEDS TO GET ONE AS HE HAS A [place] ID NOW.  
PT DENIES HAVING FUNDS TO ASSIST HIMSELF, PT REPORTS HE HAS NO FAMILY OR 
FRIENDS TO ASSIST AT DISCHARGE.  CM DISCUSSED AVAILABILITY OF SHELTER AT 
[facility] IN [place].  PT STATES AWARENESS OF THE SHELTER, 
REPORTS "THAT'S LIKE A penitentiary".  PT WILL NOT RETURN TO THE SHELTER.  CM OFFERED 
TO LOOK FOR SHELTER OUTSIDE OF [place], PT REFUSED.  PT IS NOT SURE HOW 
LONG HE WILL BE STAYING IN [place].  CM DISCUSSED EMERGENCY ASSISTANCE, 
FOOD PANTRY AND HOT MEAL DAILY AT [facility], PT REPORTS HE IS AWARE OF 
RESOURCE.  CM PROVIDED PT WITH [facility] ADDRESS AS WELL AS CONTACT FOR 
 
[facility] OUT OF POVERTY PROGRAM.  PT DID 
NOT VERBALIZE INTEREST, CM LET INFORMATION ANYWAY.  PT HAS NO PRIMARY DOCTOR, 
BUT DOES HAVE MEDICAID IN [place].  CM PROVIDED [facility] 
INFORMATION.  CM ASKED PT HOW HE IS TRANSPORTING WHEN HE LEAVES, PT REPORTS 
WALKING BUT HE HAS NO SHOES AND WEARS SIZE 9.    
  
CM WILL PROVIDE PT WITH A BUS PASS AT DISCHARGE.  PT HAS DECLINED SHELTER 
PLACEMENT ASSISTANCE, REFUSES TO STAY AT THE [facility] SAYING IT 
IS LIKE A penitentiary.  CM PROVIDED PT WITH LOCAL RESOURCE INFORMATION.  CM TO 
FOLLOW AND ASSIST AS NEEDED.  
  
: Bennett Preciado
 DCPIA - Discharge Planning Initial Assessment
 
Updated by [id number]: Bennett Preciado on 19   5:57 pm
*  Is the patient Alert and Oriented?
Yes
*  How many steps to enter\exit or inside your home? NONE *  PCP NONE *  Pharmacy NONE * 
Preadmission Environment
Homeless
*  Other Environment
HOMELESS, REPORTS LIVING IN "[facility]"
*  Facility Name
NONE
*  ADLs
Independent
*  Equipment
None
*  Other Equipment
NO MEDICAL EQUIPMENT PROVIDER PREFERENCE
*  List name and contact numbers for known caregivers / representatives who 
currently or will assist patient after discharge:
NONE PER PATIENT
*  Verbal permission to speak to the caregivers and representatives has been 
obtained from the patient.
N/A
*  Community resources currently utilized
None
*  Please name any agencies selected above.
NONE
*  Additional services required to return to the preadmission environment?
No
*  Can the patient safely return to the preadmission environment?
Yes
*  Has this patient been hospitalized within the prior 30 days at any 
hospital?
No
 
 
 
 
 
 
 
 
Last DP export: 19   7:41 a
Patient Name: ENOCH PEDERSON
Encounter #: [id number]
Page [phone]
 
 
 
 
 
Electronically Signed by DEXTER ASHLEY on 19 at 1639
 
 
 
 
 
 
**All edits/amendments must be made on the electronic document**
 
DICTATION DATE: 19     : FRANTZ  19     
RPT#: [id number]                                [place] DATE:        
                                               STATUS: ADM IN  
[facility]
 [place] [address]
***END OF REPORT***

## 2019-01-28 NOTE — NUR
ALERT AND ORIENTED X4. SITTING UP IN BED. DC RT FA IV TIP INTACT. DISCHARGE
INSTRUCTIONS PROVIDED VERBALLY AND WRITTEN. DISCHARGE PAPERS SIGNED ON CHART.
ESCORT TO FRONT DOOR VIA WHEELCHAIR. REMAINS FREE FROM INJURY.

## 2019-01-28 NOTE — NUR
Nutrition follow-up:
Diet: Regular
PO intake 100% of meals
Labs reviewed
Wt: 99#
PO intake good at this time; pt eating a lot of food; food preferences being
honored.
RDN following.

## 2019-01-29 NOTE — MORECARE
CASE MANAGEMENT DISCHARGE SUMMARY
 
 
PATIENT: ENOCH PEDERSON DON                UNIT: [id number]
ACCOUNT#: [id number]                       ADM DATE: 19
AGE: 28     : [birth year]  SEX: M            ROOM/BED: D.2131    
AUTHOR: GABIDOC                             PHYSICIAN:                               
 
REFERRING PHYSICIAN: ATA CARVALHO DO            
DATE OF SERVICE: 19
Discharge Plan
 
 
Patient Name: ENOCH PEDERSON
Facility: [facility]:[place]
Encounter #: [id number]
Medical Record #: [id number]
: 1990
Planned Disposition: Home
Anticipated Discharge Date: 19
 
Discharge Date: 2019
Expected LOS: 5
Initial Reviewer: [id number]
Initial Review Date: 2019
Generated: 19   9:03 am 
Comments
 
DCP- Discharge Planning
 
Updated by [email or web address]: Payton Villalba on 19   3:30 pm CT
PATIENTS BEDSIDE NURSE CAME TO ME ASKING FOR A LETTER FOR THE PATIENT STATING 
THAT WE PUT HIM ON KLONOPIN AND ULTRAM HERE AT THE HOSPITAL BECAUSE HE WILL 
NEED THAT FOR HIS . I PROVIDED A NOTICE LETTER AND HAND 
WROTE THAT THE PATIENT WAS ON THE FOLLOWING MEDS, THE LAST TIME THEY WERE 
TAKEN, WHAT THEY WERE ORDERED FOR, AND THAT THEY WERE NOT PRESCRIBED FOR THE 
PATIENT ON DISCHARGE. I SIGNED MY NAME, CREDENTIALS, AND CONTACT NUMBER FOR 
QUESTIONS. I ALSO NOTED FOR THE PATIENT THAT A COPY OF THE LETTER WAS PLACED 
IN HIS CHART.   
THIS PATIENT IS NOT WANTING TO LEAVE THE HOSPITAL DUE TO BEING HOMELESS, BUT 
HIS  COULD ASSIST IN CHANGING THAT IF HE ASKED.
DCP- Discharge Planning
 
Updated by [id number]: Bennett Preciado on 19   7:36 am CT
Patient Name:  ENOCH PEDERSON   
Encounter No:  [id number]   
:  1990   
Primary Insurance:  MEDICAID [place]  
Anticipated DC Date: 2019   
Planned Disposition:  Home  
 
  
  
DCP follow-up note: CM SPOKE TO PT IN ROOM REGARDING DISCHARGE PLANNING.  PT 
IS STILL REFUSING SHELTER PLACEMENT OUT OF [facility] AND WILL NOT GO 
TO [facility].  PT IS AWARE OF LOCAL SHELTER AND 
RESOURCES AVAILABLE TO HIM.  PT REPORTS PLAN OF CONTINUED HOMELESSNESS AT 
DISCHARGE.  PT DOES NOT HAVE AN INCOME, DOES NOT RECEIVE FOOD STAMPS, 
DISABILITY OR UNEMPLOYMENT. PT IS NOT EMPLOYED.   PT REPORTS HE LIVES WITH 
WHAT HE CAN GET FROM LOCAL ORGANIZATIONS.  PT HAS BEEN PROVIDED WITH LOCAL 
NEWSPAPER FOR TODAY.  CM PROVIDED PT WITH A PAIR OF SHOES AS PT REPORTED HE 
HAS NONE.  CM PROVIDED PT WITH AN OLD BUT SERVICEABLE UMBRELLA AND THREE 
LARGE TRASH BAGS.  CM EXPLAINED HOW TO USE PLASTIC BAGS FOR A RAIN PONCHO, 
SHELTER ADRYAN AND SHELTER FABRICIO IF NEEDED.  CM PROVIDED ONE BUS PASS SO 
PT CAN GET BACK TO [facility] AND COMMUNITY [facility] 
ORGANIZATIONS THAT PT IS UTILIZING AT DISCHARGE.  PT DOES HAVE [place] 
MEDICAID.  
  
PT REFUSES SHELTER PLACEMENT.  PT IS AWARE OF COMMUNITY RESOURCES.  CM HAS 
PROVIDED PT WITH INFORMATION AND MATERIALS TO ASSIST HIMSELF.  NO FURHTER 
DISCHARGE NEEDS IDENTIFIED AT THIS TIME.    
  
Bennett Preciado, CASE MANAGEMENT
DCP- Discharge Planning
 
Updated by [id number]: Bennett Preciado on 19   5:04 pm CT
Patient Name: ENOCH PEDERSON                                     
Admission Status: ER   
Accout number: [id number]                              
Admission Date: 2019   
: 1990                                                        
Admission Diagnosis:   
Attending: ATA CARVALHO                                                
Current LOS:  1   
  
Anticipated DC Date: 2019   
Planned Disposition: Home   
Primary Insurance: MEDICAID [place]   
  
  
Discharge Planning Comments:   
CM RECEIVED CONSULT FOR HOMLESSNESS.  CM MET WITH PT IN ROOM TO DISCUSS 
DISCHARGE PLANNING AND NEEDS.  PT REPORTS LIVING IN THE [place]OD[place] FOR MONTHS, HAS 
BEEN HOMELESS FOR " A WHILE."  PT USES NO MEDICAL EQUIPMENT.  PT REPORTS 
HAVING NO [place] ID AND HE NEEDS TO GET ONE AS HE HAS A [place] ID NOW.  
PT DENIES HAVING FUNDS TO ASSIST HIMSELF, PT REPORTS HE HAS NO FAMILY OR 
FRIENDS TO ASSIST AT DISCHARGE.  CM DISCUSSED AVAILABILITY OF SHELTER AT 
[facility] IN [place].  PT STATES AWARENESS OF THE SHELTER, 
REPORTS "THAT'S LIKE A half-way".  PT WILL NOT RETURN TO THE SHELTER.  CM OFFERED 
TO LOOK FOR SHELTER OUTSIDE OF [place], PT REFUSED.  PT IS NOT SURE HOW 
LONG HE WILL BE STAYING IN [place].  CM DISCUSSED EMERGENCY ASSISTANCE, 
FOOD PANTRY AND HOT MEAL DAILY AT [facility], PT REPORTS HE IS AWARE OF 
RESOURCE.  CM PROVIDED PT WITH [facility] ADDRESS AS WELL AS CONTACT FOR 
 
[facility] BRIDGES OUT OF POVERTY PROGRAM.  PT DID 
NOT VERBALIZE INTEREST, CM LET INFORMATION ANYWAY.  PT HAS NO PRIMARY DOCTOR, 
BUT DOES HAVE MEDICAID IN [place].  CM PROVIDED [facility] 
INFORMATION.  CM ASKED PT HOW HE IS TRANSPORTING WHEN HE LEAVES, PT REPORTS 
WALKING BUT HE HAS NO SHOES AND WEARS SIZE 9.    
  
CM WILL PROVIDE PT WITH A BUS PASS AT DISCHARGE.  PT HAS DECLINED SHELTER 
PLACEMENT ASSISTANCE, REFUSES TO STAY AT THE [facility] SAYING IT 
IS LIKE A half-way.  CM PROVIDED PT WITH LOCAL RESOURCE INFORMATION.  CM TO 
FOLLOW AND ASSIST AS NEEDED.  
  
: Bennett Preciado
 DCPIA - Discharge Planning Initial Assessment
 
Updated by [id number]: Bennett Preciado on 19   5:57 pm
*  Is the patient Alert and Oriented?
Yes
*  How many steps to enter\exit or inside your home? NONE *  PCP NONE *  Pharmacy NONE * 
Preadmission Environment
Homeless
*  Other Environment
HOMELESS, REPORTS LIVING IN "THE WOODS"
*  Facility Name
NONE
*  ADLs
Independent
*  Equipment
None
*  Other Equipment
NO MEDICAL EQUIPMENT PROVIDER PREFERENCE
*  List name and contact numbers for known caregivers / representatives who 
currently or will assist patient after discharge:
NONE PER PATIENT
*  Verbal permission to speak to the caregivers and representatives has been 
obtained from the patient.
N/A
*  Community resources currently utilized
None
*  Please name any agencies selected above.
NONE
*  Additional services required to return to the preadmission environment?
No
*  Can the patient safely return to the preadmission environment?
Yes
*  Has this patient been hospitalized within the prior 30 days at any 
hospital?
No
 
 
 
 
 
 
 
 
Last DP export: 19   3:39 p
Patient Name: ENOCH PEDERSON
Encounter #: [id number]
Page [id number]
 
 
 
 
 
Electronically Signed by DEXTER ASHLEY on 19 at 0804
 
 
 
 
 
 
**All edits/amendments must be made on the electronic document**
 
DICTATION DATE: 19 08     : FRANTZ  19 08     
RPT#: [id number]                                DC DATE:19
                                               STATUS: DIS IN  
[facility]
[address] [place] [address]
***END OF REPORT***

## 2019-03-22 ENCOUNTER — HOSPITAL ENCOUNTER (EMERGENCY)
Dept: HOSPITAL 84 - D.ER | Age: 29
Discharge: HOME | End: 2019-03-22
Payer: MEDICAID

## 2019-03-22 ENCOUNTER — HOSPITAL ENCOUNTER (EMERGENCY)
Dept: HOSPITAL 84 - D.ER | Age: 29
LOS: 1 days | Discharge: HOME | End: 2019-03-23
Payer: MEDICAID

## 2019-03-22 ENCOUNTER — HOSPITAL ENCOUNTER (EMERGENCY)
Dept: HOSPITAL 84 - D.ER | Age: 29
Discharge: LEFT BEFORE DISCHARGE | End: 2019-03-22
Payer: MEDICAID

## 2019-03-22 VITALS
BODY MASS INDEX: 19.49 KG/M2 | BODY MASS INDEX: 19.49 KG/M2 | HEIGHT: 63 IN | WEIGHT: 110 LBS | WEIGHT: 110 LBS | HEIGHT: 63 IN

## 2019-03-22 VITALS — SYSTOLIC BLOOD PRESSURE: 124 MMHG | DIASTOLIC BLOOD PRESSURE: 90 MMHG

## 2019-03-22 VITALS — HEIGHT: 63 IN | WEIGHT: 120.25 LBS | BODY MASS INDEX: 21.3 KG/M2

## 2019-03-22 VITALS
BODY MASS INDEX: 17.75 KG/M2 | HEIGHT: 63 IN | BODY MASS INDEX: 17.75 KG/M2 | WEIGHT: 100.21 LBS | HEIGHT: 63 IN | WEIGHT: 100.21 LBS

## 2019-03-22 VITALS — SYSTOLIC BLOOD PRESSURE: 118 MMHG | DIASTOLIC BLOOD PRESSURE: 72 MMHG

## 2019-03-22 DIAGNOSIS — R11.2: ICD-10-CM

## 2019-03-22 DIAGNOSIS — E86.0: ICD-10-CM

## 2019-03-22 DIAGNOSIS — R19.7: ICD-10-CM

## 2019-03-22 DIAGNOSIS — A08.4: Primary | ICD-10-CM

## 2019-03-22 DIAGNOSIS — R11.10: Primary | ICD-10-CM

## 2019-03-22 DIAGNOSIS — E46: ICD-10-CM

## 2019-03-22 LAB
ALBUMIN SERPL-MCNC: 4 G/DL (ref 3.4–5)
ALP SERPL-CCNC: 135 U/L (ref 46–116)
ALT SERPL-CCNC: 18 U/L (ref 10–68)
AMYLASE SERPL-CCNC: 71 U/L (ref 25–115)
ANION GAP SERPL CALC-SCNC: 12.6 MMOL/L (ref 8–16)
APPEARANCE UR: CLEAR
BACTERIA #/AREA URNS HPF: (no result) /HPF
BASOPHILS NFR BLD AUTO: 0.5 % (ref 0–2)
BILIRUB SERPL-MCNC: 0.31 MG/DL (ref 0.2–1.3)
BILIRUB SERPL-MCNC: NEGATIVE MG/DL
BUN SERPL-MCNC: 23 MG/DL (ref 7–18)
CALCIUM SERPL-MCNC: 8.5 MG/DL (ref 8.5–10.1)
CHLORIDE SERPL-SCNC: 101 MMOL/L (ref 98–107)
CO2 SERPL-SCNC: 25.2 MMOL/L (ref 21–32)
COLOR UR: YELLOW
CREAT SERPL-MCNC: 1.1 MG/DL (ref 0.6–1.3)
EOSINOPHIL NFR BLD: 3 % (ref 0–7)
ERYTHROCYTE [DISTWIDTH] IN BLOOD BY AUTOMATED COUNT: 13.1 % (ref 11.5–14.5)
GLOBULIN SER-MCNC: 4.5 G/L
GLUCOSE SERPL-MCNC: 99 MG/DL (ref 74–106)
GLUCOSE SERPL-MCNC: NEGATIVE MG/DL
HCT VFR BLD CALC: 49.3 % (ref 42–54)
HGB BLD-MCNC: 16.9 G/DL (ref 13.5–17.5)
IMM GRANULOCYTES NFR BLD: 0.3 % (ref 0–5)
KETONES UR STRIP-MCNC: NEGATIVE MG/DL
LIPASE SERPL-CCNC: 153 U/L (ref 73–393)
LYMPHOCYTES NFR BLD AUTO: 20.6 % (ref 15–50)
MCH RBC QN AUTO: 32.1 PG (ref 26–34)
MCHC RBC AUTO-ENTMCNC: 34.3 G/DL (ref 31–37)
MCV RBC: 93.7 FL (ref 80–100)
MONOCYTES NFR BLD: 4.7 % (ref 2–11)
NEUTROPHILS NFR BLD AUTO: 70.9 % (ref 40–80)
NITRITE UR-MCNC: NEGATIVE MG/ML
OSMOLALITY SERPL CALC.SUM OF ELEC: 273 MOSM/KG (ref 275–300)
PH UR STRIP: 5 [PH] (ref 5–6)
PLATELET # BLD: 282 10X3/UL (ref 130–400)
PMV BLD AUTO: 10.4 FL (ref 7.4–10.4)
POTASSIUM SERPL-SCNC: 3.8 MMOL/L (ref 3.5–5.1)
PROT SERPL-MCNC: 8.5 G/DL (ref 6.4–8.2)
PROT UR-MCNC: (no result) MG/DL
RBC # BLD AUTO: 5.26 10X6/UL (ref 4.2–6.1)
RBC #/AREA URNS HPF: (no result) /HPF (ref 0–5)
SODIUM SERPL-SCNC: 135 MMOL/L (ref 136–145)
SP GR UR STRIP: 1.01 (ref 1–1.02)
SQUAMOUS #/AREA URNS HPF: (no result) /HPF (ref 0–5)
UROBILINOGEN UR-MCNC: NORMAL MG/DL
WBC # BLD AUTO: 7.4 10X3/UL (ref 4.8–10.8)
WBC #/AREA URNS HPF: (no result) /HPF (ref 0–5)

## 2019-03-23 VITALS — SYSTOLIC BLOOD PRESSURE: 120 MMHG | DIASTOLIC BLOOD PRESSURE: 84 MMHG
